# Patient Record
Sex: FEMALE | Race: WHITE | Employment: UNEMPLOYED | URBAN - METROPOLITAN AREA
[De-identification: names, ages, dates, MRNs, and addresses within clinical notes are randomized per-mention and may not be internally consistent; named-entity substitution may affect disease eponyms.]

---

## 2020-09-18 ENCOUNTER — HOSPITAL ENCOUNTER (INPATIENT)
Age: 50
LOS: 3 days | Discharge: HOME | DRG: 885 | End: 2020-09-21
Attending: EMERGENCY MEDICINE | Admitting: PSYCHIATRY & NEUROLOGY
Payer: COMMERCIAL

## 2020-09-18 DIAGNOSIS — F33.2 SEVERE EPISODE OF RECURRENT MAJOR DEPRESSIVE DISORDER, WITHOUT PSYCHOTIC FEATURES (HCC): Primary | ICD-10-CM

## 2020-09-18 DIAGNOSIS — F43.20 EMOTIONAL CRISIS: ICD-10-CM

## 2020-09-18 PROBLEM — F32.9 MAJOR DEPRESSIVE DISORDER: Status: ACTIVE | Noted: 2020-09-18

## 2020-09-18 LAB
ALBUMIN SERPL-MCNC: 3.8 G/DL (ref 3.4–5)
ALBUMIN/GLOB SERPL: 0.8 {RATIO} (ref 0.8–1.7)
ALP SERPL-CCNC: 110 U/L (ref 45–117)
ALT SERPL-CCNC: 50 U/L (ref 13–56)
AMPHET UR QL SCN: NEGATIVE
ANION GAP SERPL CALC-SCNC: 9 MMOL/L (ref 3–18)
AST SERPL-CCNC: 32 U/L (ref 10–38)
BARBITURATES UR QL SCN: NEGATIVE
BASOPHILS # BLD: 0.1 K/UL (ref 0–0.1)
BASOPHILS NFR BLD: 1 % (ref 0–2)
BENZODIAZ UR QL: NEGATIVE
BILIRUB SERPL-MCNC: 0.3 MG/DL (ref 0.2–1)
BUN SERPL-MCNC: 14 MG/DL (ref 7–18)
BUN/CREAT SERPL: 17 (ref 12–20)
CALCIUM SERPL-MCNC: 9 MG/DL (ref 8.5–10.1)
CANNABINOIDS UR QL SCN: NEGATIVE
CHLORIDE SERPL-SCNC: 107 MMOL/L (ref 100–111)
CO2 SERPL-SCNC: 25 MMOL/L (ref 21–32)
COCAINE UR QL SCN: NEGATIVE
CREAT SERPL-MCNC: 0.83 MG/DL (ref 0.6–1.3)
DIFFERENTIAL METHOD BLD: ABNORMAL
EOSINOPHIL # BLD: 0.1 K/UL (ref 0–0.4)
EOSINOPHIL NFR BLD: 1 % (ref 0–5)
ERYTHROCYTE [DISTWIDTH] IN BLOOD BY AUTOMATED COUNT: 12.4 % (ref 11.6–14.5)
ETHANOL SERPL-MCNC: <3 MG/DL (ref 0–3)
GLOBULIN SER CALC-MCNC: 4.7 G/DL (ref 2–4)
GLUCOSE SERPL-MCNC: 118 MG/DL (ref 74–99)
HCT VFR BLD AUTO: 50 % (ref 35–45)
HDSCOM,HDSCOM: NORMAL
HGB BLD-MCNC: 17.5 G/DL (ref 12–16)
LYMPHOCYTES # BLD: 1.3 K/UL (ref 0.9–3.6)
LYMPHOCYTES NFR BLD: 15 % (ref 21–52)
MCH RBC QN AUTO: 32.8 PG (ref 24–34)
MCHC RBC AUTO-ENTMCNC: 35 G/DL (ref 31–37)
MCV RBC AUTO: 93.8 FL (ref 74–97)
METHADONE UR QL: NEGATIVE
MONOCYTES # BLD: 0.6 K/UL (ref 0.05–1.2)
MONOCYTES NFR BLD: 7 % (ref 3–10)
NEUTS SEG # BLD: 6.5 K/UL (ref 1.8–8)
NEUTS SEG NFR BLD: 76 % (ref 40–73)
OPIATES UR QL: NEGATIVE
PCP UR QL: NEGATIVE
PLATELET # BLD AUTO: 308 K/UL (ref 135–420)
PMV BLD AUTO: 10.8 FL (ref 9.2–11.8)
POTASSIUM SERPL-SCNC: 3.4 MMOL/L (ref 3.5–5.5)
PROT SERPL-MCNC: 8.5 G/DL (ref 6.4–8.2)
RBC # BLD AUTO: 5.33 M/UL (ref 4.2–5.3)
SODIUM SERPL-SCNC: 141 MMOL/L (ref 136–145)
WBC # BLD AUTO: 8.5 K/UL (ref 4.6–13.2)

## 2020-09-18 PROCEDURE — 80307 DRUG TEST PRSMV CHEM ANLYZR: CPT

## 2020-09-18 PROCEDURE — 80053 COMPREHEN METABOLIC PANEL: CPT

## 2020-09-18 PROCEDURE — 65220000001 HC RM PRIVATE PSYCH

## 2020-09-18 PROCEDURE — 99285 EMERGENCY DEPT VISIT HI MDM: CPT

## 2020-09-18 PROCEDURE — 99284 EMERGENCY DEPT VISIT MOD MDM: CPT

## 2020-09-18 PROCEDURE — 85025 COMPLETE CBC W/AUTO DIFF WBC: CPT

## 2020-09-18 PROCEDURE — 74011250637 HC RX REV CODE- 250/637: Performed by: EMERGENCY MEDICINE

## 2020-09-18 PROCEDURE — 87635 SARS-COV-2 COVID-19 AMP PRB: CPT

## 2020-09-18 RX ORDER — POTASSIUM CHLORIDE 20 MEQ/1
20 TABLET, EXTENDED RELEASE ORAL
Status: COMPLETED | OUTPATIENT
Start: 2020-09-18 | End: 2020-09-18

## 2020-09-18 RX ADMIN — POTASSIUM CHLORIDE 20 MEQ: 1500 TABLET, EXTENDED RELEASE ORAL at 21:17

## 2020-09-18 NOTE — ED PROVIDER NOTES
Patient is a 56-year-old female who presents to the emergency department today with a chief complaint of mental health crisis. Patient evidently being served with a ECO. I read the EC O and evident the  filed this with the  because the patient has been increasingly withdrawn, not going to work, acting increasingly depressed, not eating with increasing somnolence. Also noted to have delusions reported on the ECO by her , however patient denies any of this for me. Reports that she has been depressed and more somnolent and withdrawn. She denies any somatic symptoms at this time. Denies any SI, HI or AVH. Denies any prior suicidal attempts. Past Medical History:   Diagnosis Date    Anxiety     Depression     Hypercholesteremia        History reviewed. No pertinent surgical history. History reviewed. No pertinent family history.     Social History     Socioeconomic History    Marital status:      Spouse name: Not on file    Number of children: Not on file    Years of education: Not on file    Highest education level: Not on file   Occupational History    Not on file   Social Needs    Financial resource strain: Not on file    Food insecurity     Worry: Not on file     Inability: Not on file    Transportation needs     Medical: Not on file     Non-medical: Not on file   Tobacco Use    Smoking status: Never Smoker   Substance and Sexual Activity    Alcohol use: No    Drug use: No    Sexual activity: Not on file   Lifestyle    Physical activity     Days per week: Not on file     Minutes per session: Not on file    Stress: Not on file   Relationships    Social connections     Talks on phone: Not on file     Gets together: Not on file     Attends Yazidi service: Not on file     Active member of club or organization: Not on file     Attends meetings of clubs or organizations: Not on file     Relationship status: Not on file    Intimate partner violence Fear of current or ex partner: Not on file     Emotionally abused: Not on file     Physically abused: Not on file     Forced sexual activity: Not on file   Other Topics Concern    Not on file   Social History Narrative    Not on file   Non-smoker, denies alcohol or drug use. No ongoing medical problems. ALLERGIES: Sulfa (sulfonamide antibiotics)    Review of Systems   Constitutional: Negative for chills and fever. HENT: Negative for congestion, rhinorrhea, sinus pressure and sneezing. Eyes: Negative for visual disturbance. Respiratory: Negative for cough and shortness of breath. Cardiovascular: Negative for chest pain. Gastrointestinal: Negative for abdominal pain, diarrhea, nausea and vomiting. Genitourinary: Negative for dysuria, frequency and urgency. Musculoskeletal: Negative for back pain and neck pain. Skin: Negative for rash. Neurological: Negative for syncope, numbness and headaches. Psychiatric/Behavioral: Negative for suicidal ideas. Vitals:    09/18/20 1726   BP: 135/81   Pulse: 99   Resp: 19   Temp: 97.5 °F (36.4 °C)   SpO2: 98%            Physical Exam  Constitutional:       General: She is not in acute distress. Appearance: Normal appearance. She is normal weight. She is not ill-appearing or toxic-appearing. HENT:      Head: Normocephalic and atraumatic. Right Ear: External ear normal.      Left Ear: External ear normal.      Nose: Nose normal. No congestion or rhinorrhea. Mouth/Throat:      Mouth: Mucous membranes are moist.      Pharynx: Oropharynx is clear. No oropharyngeal exudate or posterior oropharyngeal erythema. Eyes:      Extraocular Movements: Extraocular movements intact. Conjunctiva/sclera: Conjunctivae normal.      Pupils: Pupils are equal, round, and reactive to light. Neck:      Musculoskeletal: Normal range of motion and neck supple. No muscular tenderness.    Cardiovascular:      Rate and Rhythm: Normal rate and regular rhythm. Pulses: Normal pulses. Heart sounds: Normal heart sounds. No murmur. Pulmonary:      Effort: Pulmonary effort is normal.      Breath sounds: Normal breath sounds. No wheezing, rhonchi or rales. Abdominal:      General: Abdomen is flat. Palpations: Abdomen is soft. Tenderness: There is no abdominal tenderness. There is no guarding or rebound. Musculoskeletal: Normal range of motion. General: No swelling, tenderness or deformity. Skin:     General: Skin is warm and dry. Capillary Refill: Capillary refill takes less than 2 seconds. Findings: No rash. Neurological:      General: No focal deficit present. Mental Status: She is alert. Cranial Nerves: No cranial nerve deficit. Sensory: No sensory deficit. Motor: No weakness. Comments: Oriented x4. Psychiatric:         Attention and Perception: Attention and perception normal.         Mood and Affect: Mood and affect normal. Affect is not angry, tearful or inappropriate. Speech: Speech normal.         Behavior: Behavior normal.       Recent Results (from the past 12 hour(s))   CBC WITH AUTOMATED DIFF    Collection Time: 09/18/20  5:36 PM   Result Value Ref Range    WBC 8.5 4.6 - 13.2 K/uL    RBC 5.33 (H) 4.20 - 5.30 M/uL    HGB 17.5 (H) 12.0 - 16.0 g/dL    HCT 50.0 (H) 35.0 - 45.0 %    MCV 93.8 74.0 - 97.0 FL    MCH 32.8 24.0 - 34.0 PG    MCHC 35.0 31.0 - 37.0 g/dL    RDW 12.4 11.6 - 14.5 %    PLATELET 384 693 - 597 K/uL    MPV 10.8 9.2 - 11.8 FL    NEUTROPHILS 76 (H) 40 - 73 %    LYMPHOCYTES 15 (L) 21 - 52 %    MONOCYTES 7 3 - 10 %    EOSINOPHILS 1 0 - 5 %    BASOPHILS 1 0 - 2 %    ABS. NEUTROPHILS 6.5 1.8 - 8.0 K/UL    ABS. LYMPHOCYTES 1.3 0.9 - 3.6 K/UL    ABS. MONOCYTES 0.6 0.05 - 1.2 K/UL    ABS. EOSINOPHILS 0.1 0.0 - 0.4 K/UL    ABS.  BASOPHILS 0.1 0.0 - 0.1 K/UL    DF AUTOMATED     METABOLIC PANEL, COMPREHENSIVE    Collection Time: 09/18/20  5:36 PM   Result Value Ref Range Sodium 141 136 - 145 mmol/L    Potassium 3.4 (L) 3.5 - 5.5 mmol/L    Chloride 107 100 - 111 mmol/L    CO2 25 21 - 32 mmol/L    Anion gap 9 3.0 - 18 mmol/L    Glucose 118 (H) 74 - 99 mg/dL    BUN 14 7.0 - 18 MG/DL    Creatinine 0.83 0.6 - 1.3 MG/DL    BUN/Creatinine ratio 17 12 - 20      GFR est AA >60 >60 ml/min/1.73m2    GFR est non-AA >60 >60 ml/min/1.73m2    Calcium 9.0 8.5 - 10.1 MG/DL    Bilirubin, total 0.3 0.2 - 1.0 MG/DL    ALT (SGPT) 50 13 - 56 U/L    AST (SGOT) 32 10 - 38 U/L    Alk. phosphatase 110 45 - 117 U/L    Protein, total 8.5 (H) 6.4 - 8.2 g/dL    Albumin 3.8 3.4 - 5.0 g/dL    Globulin 4.7 (H) 2.0 - 4.0 g/dL    A-G Ratio 0.8 0.8 - 1.7     ETHYL ALCOHOL    Collection Time: 09/18/20  5:36 PM   Result Value Ref Range    ALCOHOL(ETHYL),SERUM <3 0 - 3 MG/DL   DRUG SCREEN, URINE    Collection Time: 09/18/20  8:10 PM   Result Value Ref Range    BENZODIAZEPINES Negative NEG      BARBITURATES Negative NEG      THC (TH-CANNABINOL) Negative NEG      OPIATES Negative NEG      PCP(PHENCYCLIDINE) Negative NEG      COCAINE Negative NEG      AMPHETAMINES Negative NEG      METHADONE Negative NEG      HDSCOM (NOTE)           MDM  Number of Diagnoses or Management Options  Diagnosis management comments: 49-year-old female presenting with ECL for concerns about abnormal behavior. Evidently the patient's  indicated that patient has been behaving abnormally and is delusional.  She does not appear to be responding to internal stimuli on my encounter with her. She denies any suicidal homicidal ideations. She has no complaints really at all today. She endorses being depressed, and that she has not been going to work and has not been leaving her house or eating. Differential includes major depressive disorder, psychotic disorder, schizophrenia, bipolar disorder, emotional crisis, malingering, generalized anxiety disorder, schizoaffective disorder. Patient is here under ECO.   She is medically clear at this point her labs reflect polycythemia could be dehydration versus secondary to his smoking. Patient will be seen by the community service Board and screened at that point. I understand the patient has a TDO per community service Board. Further disposition to follow. Patient's care is discussed and questions answered. Oncoming physician introduced to the patient. Patient signed off to the oncoming ED physician, Dr. Dickens Her 9:15 PM  Patient is awaiting CSB/Psych eval  Please reevaluate prior to disposition. ED Course as of Sep 18 2102   Fri Sep 18, 2020   2017 Patient's lab reflux and polycythemia, likely secondary to dehydration versus chronic due to smoking. Patient at this point medically clear. [TIAN]      ED Course User Index  [TIAN] Radhames Whitten DO       Procedures    .

## 2020-09-18 NOTE — ED TRIAGE NOTES
Pt to ED Via [employer] custody, who report that pt has been talking to herself and is paranoid. [employer] reports pt refused to seek help so family took out ECO. Pt presents cooperative with staff, denies SI and HI visible or auditory hallucinations.

## 2020-09-19 PROBLEM — F33.2 SEVERE EPISODE OF RECURRENT MAJOR DEPRESSIVE DISORDER, WITHOUT PSYCHOTIC FEATURES (HCC): Status: ACTIVE | Noted: 2020-09-19

## 2020-09-19 PROBLEM — F32.9 MAJOR DEPRESSIVE DISORDER: Status: RESOLVED | Noted: 2020-09-18 | Resolved: 2020-09-19

## 2020-09-19 LAB
COVID-19 RAPID TEST, COVR: NOT DETECTED
SOURCE, COVRS: NORMAL
SPECIMEN TYPE, XMCV1T: NORMAL

## 2020-09-19 PROCEDURE — 74011250637 HC RX REV CODE- 250/637: Performed by: PSYCHIATRY & NEUROLOGY

## 2020-09-19 PROCEDURE — 65220000003 HC RM SEMIPRIVATE PSYCH

## 2020-09-19 RX ORDER — RISPERIDONE 1 MG/1
1 TABLET, FILM COATED ORAL
Status: DISCONTINUED | OUTPATIENT
Start: 2020-09-19 | End: 2020-09-20

## 2020-09-19 RX ORDER — LORAZEPAM 1 MG/1
1 TABLET ORAL
Status: DISCONTINUED | OUTPATIENT
Start: 2020-09-19 | End: 2020-09-21 | Stop reason: HOSPADM

## 2020-09-19 RX ORDER — BENZTROPINE MESYLATE 1 MG/ML
1 INJECTION INTRAMUSCULAR; INTRAVENOUS
Status: DISCONTINUED | OUTPATIENT
Start: 2020-09-19 | End: 2020-09-21 | Stop reason: HOSPADM

## 2020-09-19 RX ORDER — LORAZEPAM 2 MG/ML
1 INJECTION INTRAMUSCULAR
Status: DISCONTINUED | OUTPATIENT
Start: 2020-09-19 | End: 2020-09-21 | Stop reason: HOSPADM

## 2020-09-19 RX ORDER — TRAZODONE HYDROCHLORIDE 50 MG/1
50 TABLET ORAL
Status: DISCONTINUED | OUTPATIENT
Start: 2020-09-19 | End: 2020-09-21 | Stop reason: HOSPADM

## 2020-09-19 RX ORDER — HALOPERIDOL 5 MG/1
5 TABLET ORAL
Status: DISCONTINUED | OUTPATIENT
Start: 2020-09-19 | End: 2020-09-21 | Stop reason: HOSPADM

## 2020-09-19 RX ORDER — BENZTROPINE MESYLATE 1 MG/1
1 TABLET ORAL
Status: DISCONTINUED | OUTPATIENT
Start: 2020-09-19 | End: 2020-09-21 | Stop reason: HOSPADM

## 2020-09-19 RX ORDER — ESCITALOPRAM OXALATE 10 MG/1
10 TABLET ORAL
Status: DISCONTINUED | OUTPATIENT
Start: 2020-09-19 | End: 2020-09-21 | Stop reason: HOSPADM

## 2020-09-19 RX ORDER — HYDROXYZINE PAMOATE 50 MG/1
50 CAPSULE ORAL
Status: DISCONTINUED | OUTPATIENT
Start: 2020-09-19 | End: 2020-09-21 | Stop reason: HOSPADM

## 2020-09-19 RX ORDER — HALOPERIDOL 5 MG/ML
5 INJECTION INTRAMUSCULAR
Status: DISCONTINUED | OUTPATIENT
Start: 2020-09-19 | End: 2020-09-21 | Stop reason: HOSPADM

## 2020-09-19 RX ADMIN — ESCITALOPRAM 10 MG: 10 TABLET, FILM COATED ORAL at 20:10

## 2020-09-19 NOTE — BH NOTES
Patient's  called Kathleen Prater). He states the patient has a history of Depression with psychosis; however, she has not had an episode in over 20 years. He states recently the patient has been talking to herself frequently (as though she is responding to internal stimuli). He would like for the Dr to call him before the patient goes to court. His number is [phone].

## 2020-09-19 NOTE — H&P
History and Physical        Patient: Cindy Espinosa               Sex: female          DOA: 9/18/2020         YOB: 1970      Age:  44 y.o.        LOS:  LOS: 1 day        HPI:     Cindy Espinosa is a 44 y.o.  female who was admitted under TDO experiencing   depression, being withdrawn, delusional, missing work, and not eating or sleeping. Principal Problem:    Severe episode of recurrent major depressive disorder, without psychotic features ([facility] [address] (9/19/2020)        Past Medical History:   Diagnosis Date    Anxiety     Depression     Hypercholesteremia        History reviewed. No pertinent surgical history. History reviewed. No pertinent family history. Social History     Socioeconomic History    Marital status:      Spouse name: Not on file    Number of children: Not on file    Years of education: Not on file    Highest education level: Not on file   Tobacco Use    Smoking status: Never Smoker   Substance and Sexual Activity    Alcohol use: No    Drug use: No       Prior to Admission medications    Medication Sig Start Date End Date Taking? Authorizing Provider   melatonin 300 mcg tab Take 1.5 mg by mouth nightly as needed. 10/11/15  Yes Chaparrita Treadwell MD   atorvastatin (LIPITOR) 20 mg tablet Take 10 mg by mouth daily. Yes Other, MD Maxime   escitalopram oxalate (LEXAPRO) 10 mg tablet Take 10 mg by mouth daily. Yes Other, MD Maxime   ondansetron (ZOFRAN ODT) 4 mg disintegrating tablet Take 1 Tab by mouth every eight (8) hours as needed for Nausea. 10/11/15   Chaparrita Treadwell MD   HYDROcodone-acetaminophen (VICODIN) 5-300 mg tablet Take 1-2 Tabs by mouth every six (6) hours as needed for Pain. Max Daily Amount: 8 Tabs.  10/11/15   Chaparrita Treadwell MD       Allergies   Allergen Reactions    Sulfa (Sulfonamide Antibiotics) Anaphylaxis       Review of Systems  A comprehensive review of systems was negative except for that written in the History of Present Illness. Physical Exam:      Visit Vitals  BP (!) 144/92 (BP 1 Location: Right arm, BP Patient Position: Sitting)   Pulse 88   Temp 97.2 °F (36.2 °C)   Resp 20   SpO2 100%   Breastfeeding No       Physical Exam:  Physical Exam:   General:  Alert, cooperative, no distress, obese, appears stated age. Eyes:  Conjunctivae/corneas clear. PERRL, EOMs intact. Fundi benign   Ears:  Normal TMs and external ear canals both ears. Nose: Nares normal. Septum midline. Mucosa normal. No drainage or sinus tenderness. Mouth/Throat: Lips, mucosa, and tongue normal. Teeth and gums normal.   Neck: Supple, symmetrical, trachea midline, no adenopathy, thyroid: no enlargement/tenderness/nodules, no carotid bruit and no JVD. Back:   Symmetric, no curvature. ROM normal. No CVA tenderness. Lungs:   Clear to auscultation bilaterally. Heart:  Regular rate and rhythm, S1, S2 normal, no murmur, click, rub or gallop. Abdomen:   Soft, obese, non-tender. Bowel sounds normal. No masses,  No organomegaly. Extremities: Extremities normal, atraumatic, no cyanosis or edema. Pulses: 2+ and symmetric all extremities. Skin: Skin color, texture, turgor normal. No rashes or lesions   Lymph nodes: Cervical, supraclavicular, and axillary nodes normal.   Neurologic: CNII-XII intact. Normal strength, sensation and reflexes throughout. Assessment/Plan     Plan is for patient ro participate in all unit activities, take medications as ordered and   follow all physician's in and out patient orders.

## 2020-09-19 NOTE — H&P
[address] [facility] [address]  Inpatient Admission Note    Date of Service:  09/19/20    Historian(s): Emma Gaming and chart review  Referral Source: [facility]    Chief Complaint   TDO for depression and bizarre behavior    History of Present Illness     Emma Gaming is a 44 y.o. WHITE OR  female with a history of major depressive disorder with psychotic features who was admitted under TDO for worsening mood and delusions. According to hospital records, ECO was taken out by her  due to the patient not functioning appropriately.  stated that she has a history of major depressive disorder with psychotic features, and that in the past week she has been sleeping more than usual, not eating, missed a day at work without notifying supervisor and has been delusional.  It is unclear what  delusions she has been having. According to ED notes,  stated that patient was talking to herself and was paranoid and was refusing to come to the hospital which is why her  took out ECO. Patient was seen by [facility] crisis worker but prescreening paperwork is not in the chart. The patient reports that she has been very tired in the past week due to stress at work. The tiredness has led to increased somnolence at home. She states she also thought that she may have had the coronavirus this past week because she had muscle aches and lost her sense of taste or smell for a day. She also complains of decreasing appetite and weight loss although not sure how many pounds. Nevertheless, she says all of this is due to working too hard and not taking vacation. Work has been more stressful lately due to loss of coworkers from [employer]. She denies feeling depressed or sad although she admits to having crying spells which she says are due to work-related stress. She denies anhedonia, feelings of helplessness or hopelessness. She denies suicidal ideations.   She denies auditory or visual hallucinations or paranoia. The patient denies use of alcohol and recreational drugs. Her UDS was negative. Medical Review of Systems     Review of Systems   Constitutional: Negative for chills and fever. Patient complains of fatigue and weight loss. HENT: Negative for congestion, rhinorrhea, sinus pressure and sneezing. Eyes: Negative for visual disturbance. Respiratory: Negative for cough and shortness of breath. Cardiovascular: Negative for chest pain. Gastrointestinal: Negative for abdominal pain, diarrhea, nausea and vomiting. Genitourinary: Negative for dysuria, frequency and urgency. Musculoskeletal: Negative for back pain and neck pain. Skin: Negative for rash. Neurological: Negative for syncope, numbness and headaches. Psychiatric/Behavioral: Negative for suicidal ideas. All other systems reviewed and are negative. Psychiatric Treatment History     Self-injurious behavior/risky thoughts or behaviors (past suicidal ideation/attempt):   Denies any prior history of thoughts of self-harm or suicidal actions. Violence/Risk to others (past homicidal ideation/attempt):   Denies any prior history of violence or homicidal ideation. Previous psychiatric medication trials: Lexapro prescribed by PCP    Previous psychiatric hospitalizations: Patient denies history of prior hospitalizations    Current therapist: None    Current psychiatric provider: None    Allergies      Allergies   Allergen Reactions    Sulfa (Sulfonamide Antibiotics) Anaphylaxis       Medical History     Past Medical History:   Diagnosis Date    Anxiety     Depression     Hypercholesteremia      Past history of pancreatitis    Medication(s)     Prior to Admission Medications   Prescriptions Last Dose Informant Patient Reported? Taking? HYDROcodone-acetaminophen (VICODIN) 5-300 mg tablet Unknown at Unknown time  No No   Sig: Take 1-2 Tabs by mouth every six (6) hours as needed for Pain.  Max Daily Amount: 8 Tabs. atorvastatin (LIPITOR) 20 mg tablet 2020 at Unknown time  Yes Yes   Sig: Take 10 mg by mouth daily. escitalopram oxalate (LEXAPRO) 10 mg tablet 2020 at Unknown time  Yes Yes   Sig: Take 10 mg by mouth daily. melatonin 300 mcg tab 2020 at Unknown time  No Yes   Sig: Take 1.5 mg by mouth nightly as needed. ondansetron (ZOFRAN ODT) 4 mg disintegrating tablet Unknown at Unknown time  No No   Sig: Take 1 Tab by mouth every eight (8) hours as needed for Nausea. Facility-Administered Medications: None         Substance Abuse History     Tobacco: denied  Alcohol: denied  Marijuana: denied  Cocaine: denied  Opiate: denied  Benzodiazepine: denied  Other: denied    Consequences: none    History of detox: none    History of substance abuse treatment: none    Family History     History reviewed. No pertinent family history. Psychiatric Family History  Mother with history of depression and anxiety. Family history of suicide? No    Social History     Patient was born and raised in [place]. Raised mainly by her mother because her father  when she was about 5. She is  and has no children. She currently lives in [place] with her . She says she had a good childhood. She has 1 sister. She denies any type of physical, sexual or emotional abuse. She denies history of past trauma. She denies legal history. She has a bachelor's degree in [place]. 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     Vitals/Labs      Vitals:    20 2110 20 0250 20 0330 20 0833   BP: (!) 140/86 (!) 136/95 (!) 136/95 (!) 144/92   Pulse: 82 88 88    Resp:    Temp: 97.7 °F (36.5 °C) 97.1 °F (36.2 °C) 97 °F (36.1 °C) 97.2 °F (36.2 °C)   SpO2: 97% 100%         Labs:   Results for orders placed or performed during the hospital encounter of 20   CBC WITH AUTOMATED DIFF   Result Value Ref Range    WBC 8.5 4.6 - 13.2 K/uL RBC 5.33 (H) 4.20 - 5.30 M/uL    HGB 17.5 (H) 12.0 - 16.0 g/dL    HCT 50.0 (H) 35.0 - 45.0 %    MCV 93.8 74.0 - 97.0 FL    MCH 32.8 24.0 - 34.0 PG    MCHC 35.0 31.0 - 37.0 g/dL    RDW 12.4 11.6 - 14.5 %    PLATELET 693 279 - 041 K/uL    MPV 10.8 9.2 - 11.8 FL    NEUTROPHILS 76 (H) 40 - 73 %    LYMPHOCYTES 15 (L) 21 - 52 %    MONOCYTES 7 3 - 10 %    EOSINOPHILS 1 0 - 5 %    BASOPHILS 1 0 - 2 %    ABS. NEUTROPHILS 6.5 1.8 - 8.0 K/UL    ABS. LYMPHOCYTES 1.3 0.9 - 3.6 K/UL    ABS. MONOCYTES 0.6 0.05 - 1.2 K/UL    ABS. EOSINOPHILS 0.1 0.0 - 0.4 K/UL    ABS. BASOPHILS 0.1 0.0 - 0.1 K/UL    DF AUTOMATED     METABOLIC PANEL, COMPREHENSIVE   Result Value Ref Range    Sodium 141 136 - 145 mmol/L    Potassium 3.4 (L) 3.5 - 5.5 mmol/L    Chloride 107 100 - 111 mmol/L    CO2 25 21 - 32 mmol/L    Anion gap 9 3.0 - 18 mmol/L    Glucose 118 (H) 74 - 99 mg/dL    BUN 14 7.0 - 18 MG/DL    Creatinine 0.83 0.6 - 1.3 MG/DL    BUN/Creatinine ratio 17 12 - 20      GFR est AA >60 >60 ml/min/1.73m2    GFR est non-AA >60 >60 ml/min/1.73m2    Calcium 9.0 8.5 - 10.1 MG/DL    Bilirubin, total 0.3 0.2 - 1.0 MG/DL    ALT (SGPT) 50 13 - 56 U/L    AST (SGOT) 32 10 - 38 U/L    Alk. phosphatase 110 45 - 117 U/L    Protein, total 8.5 (H) 6.4 - 8.2 g/dL    Albumin 3.8 3.4 - 5.0 g/dL    Globulin 4.7 (H) 2.0 - 4.0 g/dL    A-G Ratio 0.8 0.8 - 1.7     ETHYL ALCOHOL   Result Value Ref Range    ALCOHOL(ETHYL),SERUM <3 0 - 3 MG/DL   DRUG SCREEN, URINE   Result Value Ref Range    BENZODIAZEPINES Negative NEG      BARBITURATES Negative NEG      THC (TH-CANNABINOL) Negative NEG      OPIATES Negative NEG      PCP(PHENCYCLIDINE) Negative NEG      COCAINE Negative NEG      AMPHETAMINES Negative NEG      METHADONE Negative NEG      HDSCOM (NOTE)    SARS-COV-2   Result Value Ref Range    Specimen source Nasopharyngeal      COVID-19 rapid test Not detected NOTD      Specimen type NP Swab         Mental Status Examination     Appearance/Hygiene 44 y.o.  WHITE OR [de-identified] female  Hygiene: Fair, dressed in hospital gown   Behavior/Social Relatedness appropriate   Musculoskeletal Gait/Station: Normal gait and station  Tone (flaccid, cogwheeling, spastic): normal  Psychomotor (hyperkinetic, hypokinetic): calm  Involuntary movements (tics, dyskinesias, akathisa, stereotypies): none   Speech   Rate, rhythm, volume, fluency and articulation are appropriate   Mood   patient denies feeling depressed. States her mood is fine   Affect    wide range   Thought Process Linear and goal directed, tight associations    Vagueness, incoherence, circumstantiality, tangentiality, neologisms, perseveration, flight of ideas, or self-contradictory statements not present on assessment   Thought Content and Perceptual Disturbances Denies delusions, ideas of reference, overvalued ideas, ruminations, obsession, compulsions, and phobias    Denies self-injurious behavior (SIB), suicidal ideation (SI), aggressive behavior or homicidal ideation (HI)    Denies auditory and visual hallucinations   Sensorium and Cognition  A&Ox4, attention intact, recent and remote memory intact, language use appropriate, and fund of knowledge age appropriate, patient is aware of current events. Insight  Limited   Judgment  fair       Suicide Risk Assessment     Admission  Date/Time: 09/19/20    [x] Admission  [] Discharge     Key Factors:   Current admission precipitated by suicide attempt?   []  Yes     2    [x]  No     1     Suicide Attempt History  [] Past attempts of high lethality    2 []  Past attempts of low lethality    1 [x]  No previous attempts       0   Suicidal Ideation []  Constant suicidal thoughts      2 []  Intermittent or fleeting suicidal  thoughts  1 [x]  Denies current suicidal thoughts    0   Suicide Plan   []  Has plan with actual OR potential access to planned method    2 []  Has plan without access to planned method      1 []  No plan            0   Plan Lethality []  Highly lethal plan (Carbon monoxide, gun, hanging, jumping)    2 []  Moderate lethality of plan          1 []  Low lethality of plan (biting, head banging, superficial scratching, pillow over face)  0   Safety Plan Agreement  []  Unwilling OR unable to agree due to impaired reality testing   2   []  Patient is ambivalent and/or guarded      1 [x]  Reliably agrees        0   Current Morbid Thoughts (reunion fantasies, preoccupations with death) []  Constantly     2     []  Frequently    1 [x]  Rarely    0   Elopement Risk  []  High risk     2 []  Moderate risk    1 [x]   Low risk    0   Symptoms    []  Hopeless  []  Helpless  []  Anhedonia   []  Guilt/shame  []  Anger/rage  [x]  Anxiety  []  Insomnia   []  Agitation   []  Impulsivity  []  5-6 symptoms present    2 []  3-4 symptoms present    1  [x]  0-2 symptoms present    0     Total Score: 1  --------------------------------------------------------------------------------------------------------------  Subjective Appraisal of Risk:  []  Patient replies not trustworthy: several non-verbal cues. []  Patient replies questionable: trustworthy: at least 1 non-verbal cue. [x]  Patient replies appear trustworthy. Protective measures (select all that apply):  [x]  Successful past responses to stress  []  Spiritual/Presybeterian beliefs  [x]  Capacity for reality testing  []  Positive therapeutic relationships  [x]  Social supports/connections  []  Positive coping skills  []  Frustration tolerance/optimism  []  Children or pets in the home  []  Sense of responsibility to family  [x]  Agrees to treatment plan and follow up    High Risk Diagnoses (select all that apply):  [x]  Depression/Bipolar Disorder  []  Dual Diagnosis  []  Cardiovascular Disease  []  Schizophrenia  []  Chronic Pain  []  Epilepsy  []  Cancer  []  Personality Disorder  []  HIV/AIDS  []  Multiple Sclerosis    Dangerousness Assessment (Suicide, homicide, property destruction. ..)    Risk Factors reviewed and risk assessed to be:  [x] low  [] low-moderate  [] moderate   [] moderate-high  [] high     Protection factors reviewed and risk assessed to be:  [x] low  [] low-moderate  [] moderate   [] moderate-high  [] high     Response to treatment and risk assessed to be:  [x] low  [] low-moderate  [] moderate   [] moderate-high  [] high     Support reviewed and risk assessed to be:  [x] low  [] low-moderate  [] moderate   [] moderate-high  [] high     Acceptance of Discharge and outpatient treatment reviewed and risk assessed to be:    [x] low  [] low-moderate  [] moderate   [] moderate-high  [] high   Overall risk assessed to be:  [x] low  [] low-moderate  [] moderate   [] moderate-high  [] high       Assessment and Plan     Psychiatric Diagnoses:   Patient Active Problem List   Diagnosis Code    Severe episode of recurrent major depressive disorder, without psychotic features ([facility] [address] F33.2       Psychosocial and contextual factors: Work-related stressors    Level of impairment/disability: Moderate    Elton Bamberger is a 44 y.o. who is currently admitted under TDO for depressive symptoms possible psychosis. Patient does not appear to be psychotic at this time. She has not been observed responding to internal stimuli and her thought process is linear and organized. 1. Admit to locked inpatient behavioral health unit. Start milieu, group, art and occupation therapy. Continue Lexapro 10 mg at bedtime  2. Obtain collateral information from her . 3. Routine labs ordered and reviewed by this provider. 4. Reviewed instructions, risks, benefits and side effects. 5. Start disposition planning; verify upcoming outpatient appointments with therapist and/or psychiatric medication prescriber.    6. Tentative date of discharge: 2-3 days       Jabari Esposito MD  [address] [facility]

## 2020-09-19 NOTE — PROGRESS NOTES
09/19/20 1301   Group Therapy   Group Nursing   Attendance Did not attend   Encouraged to attend but did not

## 2020-09-19 NOTE — ED NOTES
Gave patient report to Fermin RN of [facility]. Patient assigned to room 104. New set of vitals will be taken prior to transporting patient to room. Also awaiting to hear back from provider regarding orders for patient's anxiety.

## 2020-09-19 NOTE — BH NOTES
TDO patient received on MORENO-1 from ER via wheelchair accompanied by ER nurse and security. Patient is alert oriented x3. Patient is pleasant but guarded. Patient denies SI/HI/AVH. States I'm here because my family wanted me to get checked out. Patient states that she has a hx of depression \"but I'm not depressed now. \"They didn't tell me I was being admitted and was going to have to stay. No one explained that. I am no longer with [employer] I go to [facility]. \" Patient either denied or answered \"no\" to all of the assessment questions. Patient oriented to her room and unit. No complaints or concerns voiced. Will continue to monitor for safety and changes.   RN WILL INITIATE, DEVELOP, IMPLEMENT, REVIEW OR REVISE TREATMENT PLAN

## 2020-09-19 NOTE — ED NOTES
Patient was informed she has been assigned an inpatient bed and is anxious and agitated. Page sent to provider via .

## 2020-09-19 NOTE — PROGRESS NOTES
Problem: Depressed Mood (Adult/Pediatric)  Goal: *STG: Participates in treatment plan  Description: Patient will participate in treatment plan while in hospital.  Outcome: Progressing Towards Goal  Goal: *STG: Participates in 1:1 therapy sessions  Description: Patient will participate in 1:1 therapy sessions while in hospital.  Outcome: Progressing Towards Goal  Goal: *STG: Attends activities and groups  Description: Patient will attend activities and groups daily while in hospital  Outcome: Progressing Towards Goal  Goal: *STG: Remains safe in hospital  Description: Ptatient will remain safe while in hospital.  Outcome: Progressing Towards Goal  Goal: *STG: Complies with medication therapy  Description: Patient will comply with medication therapy while in hospital.  Outcome: Progressing Towards Goal     Problem: Falls - Risk of  Goal: *Absence of Falls  Description: Document Farooq Fall Risk and appropriate interventions in the flowsheet each shift while in hospital  Outcome: Progressing Towards Goal  Note: Fall Risk Interventions:            Medication Interventions: Teach patient to arise slowly                   Problem: Pancreatitis  Goal: *Optimize nutritional status  Description: Patients nutritional status will be optimized while in hospital.  Outcome: Progressing Towards Goal   Pt crying in the day area. She states she is just very upset about being here and does not want to be here. Pt denies hallucinations and any thoughts of harming self or others. She was able to calm down in one to one and joke smile and chuckle with this nurse. She ate her breakfast this AM. Pt did not have any scheduled medication this morning.

## 2020-09-20 VITALS
HEART RATE: 82 BPM | RESPIRATION RATE: 16 BRPM | SYSTOLIC BLOOD PRESSURE: 121 MMHG | DIASTOLIC BLOOD PRESSURE: 77 MMHG | TEMPERATURE: 97.1 F | OXYGEN SATURATION: 100 %

## 2020-09-20 PROCEDURE — 74011250637 HC RX REV CODE- 250/637: Performed by: PSYCHIATRY & NEUROLOGY

## 2020-09-20 PROCEDURE — 65220000003 HC RM SEMIPRIVATE PSYCH

## 2020-09-20 RX ADMIN — ESCITALOPRAM 10 MG: 10 TABLET, FILM COATED ORAL at 20:36

## 2020-09-20 NOTE — BH NOTES
Pt has been isolated to her room for the majority of the shift. Calm, no behavioral issues and no complaints. Compliant with escitalopram but refused scheduled risperidone -- \"I don't need that. \" Denies SI/HI/AVH at this time. Will continue to monitor for safety.

## 2020-09-20 NOTE — PROGRESS NOTES
09/20/20 1002   Group Therapy   Group Nursing   Attendance Did not attend   Pt was encouraged to attend but did not

## 2020-09-20 NOTE — PROGRESS NOTES
Problem: Depressed Mood (Adult/Pediatric)  Goal: *STG: Participates in treatment plan  Description: Patient will participate in treatment plan while in hospital.  Outcome: Progressing Towards Goal  Goal: *STG: Participates in 1:1 therapy sessions  Description: Patient will participate in 1:1 therapy sessions while in hospital.  Outcome: Progressing Towards Goal  Goal: *STG: Remains safe in hospital  Description: Ptatient will remain safe while in hospital.  Outcome: Progressing Towards Goal     Problem: Falls - Risk of  Goal: *Absence of Falls  Description: Document Heladio Celestin Fall Risk and appropriate interventions in the flowsheet each shift while in hospital  Outcome: Progressing Towards Goal  Note: Fall Risk Interventions:            Medication Interventions: Teach patient to arise slowly     Pt is isolating in bed. She refused breakfast and lunch. She is drinking water brought to her by staff. Pt denies any thoughts of harming self or others and hallucinations. She has been tearful off and on. Her  called but she refused to talk to him. She has been encouraged to come out to the day area and to eat but pt refuses. It was reported to this nurse that pt took her anti-depressant last evening but refused her risperdal stating she doesn't need it.

## 2020-09-20 NOTE — PROGRESS NOTES
[address] [facility] [address]  Inpatient Progress Note     Date of Service: 09/20/20  Hospital Day: 2     Subjective/Interval History   09/20/20    Treatment Team Notes:  Notes reviewed and/or discussed and report that Maurice is a 69-year-old female who was admitted under TDO for depressive symptoms with psychosis. Per nursing report, she has been mainly withdrawn to her room and has missed some meals. She did not eat breakfast this morning and she did not eat lunch yesterday. She refused to take Risperdal last night stating she does not need it. No psychotic symptoms have been observed so far. Her mother called and spoke to the [email or web address] Tom Smith. She informed him that the patient's brother-in-law is a psychiatrist and she is retired psych nurse and they will be able to take care of patient at home if she is released from court tomorrow. Patient interview: Maurice was interviewed by this writer today. Patient was seen Jaya Molina in bed and was still guarded. She said she was not feeling depressed but was just tired. She denied suicidal ideations. She denies psychotic symptoms. She reported decreasing appetite. She did appear somewhat exasperated with assessment questions. Option of increasing Lexapro dose was discussed with her but she was not willing to do this and did not feel that it was necessary. Objective     Visit Vitals  /89 (BP 1 Location: Right arm, BP Patient Position: Sitting)   Pulse 93   Temp 97.5 °F (36.4 °C)   Resp 16   SpO2 100%   Breastfeeding No       No results found for this or any previous visit (from the past 24 hour(s)). Mental Status Examination     Appearance/Hygiene 44 y.o.  WHITE OR  female  Hygiene: Mildly disheveled, dressed in pajamas   Behavior/Social Relatedness Appropriate   Musculoskeletal Gait/Station: appropriate  Tone (flaccid, cogwheeling, spastic): Normal  Psychomotor (hyperkinetic, hypokinetic): calm Involuntary movements (tics, dyskinesias, akathisa, stereotypies): none   Speech   Rate, rhythm, volume, fluency and articulation are appropriate   Mood   \"fine\"   Affect    restricted   Thought Process Linear and goal directed   Thought Content and Perceptual Disturbances Denies self-injurious behavior (SIB), suicidal ideation (SI), aggressive behavior or homicidal ideation (HI)    Denies auditory and visual hallucinations   Sensorium and Cognition  alert and oriented x4   Insight  Limited   Judgment  fair        Assessment/Plan      Psychiatric Diagnoses:   Patient Active Problem List   Diagnosis Code    Severe episode of recurrent major depressive disorder, without psychotic features ([facility] [address] F33.2       Patient's condition appears to be better than what was described in the emergency room. No psychosis has been observed so far. Continue Lexapro 10 mg at bedtime. Discontinue Risperdal. Hopefully she will be released by the court tomorrow and will be discharged as she really does not want to be here.     Jaswant Ramirez MD  Medical [facility]  Psychiatry

## 2020-09-21 NOTE — DISCHARGE SUMMARY
[facility]  Inpatient Psychiatry   Discharge Summary     Admit date: 9/18/2020    Discharge date and time: 9/21/2020 11:33 AM    Discharge Physician: Tran Holden MD    DISCHARGE DIAGNOSES     Psychiatric Diagnoses:   Patient Active Problem List   Diagnosis Code    Severe episode of recurrent major depressive disorder, without psychotic features ([facility] F33.2       Level of impairment/disability:  [address] [facility] is a 44 y.o. WHITE [place]  female with a history of major depressive disorder with psychotic features who was admitted under TDO for worsening mood and delusions. According to hospital records, ECO was taken out by her  due to the patient not functioning appropriately.  stated that she has a history of major depressive disorder with psychotic features, and that in the past week she has been sleeping more than usual, not eating, missed a day at work without notifying supervisor and has been delusional.  It is unclear what  delusions she has been having. According to ED notes,  stated that patient was talking to herself and was paranoid and was refusing to come to the hospital which is why her  took out ECO. Patient was seen by [facility] crisis worker but prescreening paperwork is not in the chart. The patient reports that she has been very tired in the past week due to stress at work. The tiredness has led to increased somnolence at home. She states she also thought that she may have had the coronavirus this past week because she had muscle aches and lost her sense of taste or smell for a day. She also complains of decreasing appetite and weight loss although not sure how many pounds. Nevertheless, she says all of this is due to working too hard and not taking vacation. Work has been more stressful lately due to loss of coworkers from [employer].   She denies feeling depressed or sad although she admits to having crying spells which she says are due to work-related stress. She denies anhedonia, feelings of helplessness or hopelessness. She denies suicidal ideations. She denies auditory or visual hallucinations or paranoia.     The patient denies use of alcohol and recreational drugs. Her UDS was negative.     Hospital course: Patient had brief and uneventful hospitalization. She was admitted from Saturday and she was discharged on Monday after court hearing. She was released by the court. She was passive during the hospital course. She stays mainly in her room and in her bed sleeping a lot. She said she was tired and trying to release stress from her stressful job. She was unwilling for dose of antidepressant to be increased. She was also unwilling to take antipsychotic medication. However no psychotic symptoms were observed during this hospital course. Patient states she will follow-up with a psychiatrist in the future if she feels that it is necessary. She mentioned that her brother-in-law is a psychiatrist and her mother is a psych nurse and they can take care of her at home. She denies suicidal ideations or psychotic symptoms. DISPOSITION/FOLLOW-UP     Disposition: Home    Follow-up Appointments: Follow-up Information     Follow up With Specialties Details Why Contact Info    Barbara Jurado MD Internal Medicine   [place] [id number] [phone]          Pt. has been advised to contact intake at the warm line  to complete intake with case management services  @ [address]  [phone]. Crisis Hotline at [phone] immediately. (Emergency se. .. Or pt.  Can follow up with [address] [place] [address], [facility] [address]  Phone: [phone]            Patient was relased by the court            MEDICATION CHANGES   Outpatient medications:  No current facility-administered medications on file prior to encounter. Current Outpatient Medications on File Prior to Encounter   Medication Sig Dispense Refill    melatonin 300 mcg tab Take 1.5 mg by mouth nightly as needed. 30 Tab 0    atorvastatin (LIPITOR) 20 mg tablet Take 10 mg by mouth daily.  escitalopram oxalate (LEXAPRO) 10 mg tablet Take 10 mg by mouth daily. Discharged medication:  Discharge Medication List as of 9/21/2020 10:22 AM      CONTINUE these medications which have NOT CHANGED    Details   melatonin 300 mcg tab Take 1.5 mg by mouth nightly as needed., No Print, Disp-30 Tab,R-0      atorvastatin (LIPITOR) 20 mg tablet Take 10 mg by mouth daily. , Historical Med      escitalopram oxalate (LEXAPRO) 10 mg tablet Take 10 mg by mouth daily. , Historical Med         STOP taking these medications       ondansetron (ZOFRAN ODT) 4 mg disintegrating tablet Comments:   Reason for Stopping:         HYDROcodone-acetaminophen (VICODIN) 5-300 mg tablet Comments:   Reason for Stopping:               Instructions, risks (black box warning), benefits and side effects (EPS, TD, NMS) were discussed in detail prior to discharge. Patient denied any adverse medication side effects prior to discharge. LABS/IMAGING DURING ADMISSION     Results for orders placed or performed during the hospital encounter of 09/18/20   CBC WITH AUTOMATED DIFF   Result Value Ref Range    WBC 8.5 4.6 - 13.2 K/uL    RBC 5.33 (H) 4.20 - 5.30 M/uL    HGB 17.5 (H) 12.0 - 16.0 g/dL    HCT 50.0 (H) 35.0 - 45.0 %    MCV 93.8 74.0 - 97.0 FL    MCH 32.8 24.0 - 34.0 PG    MCHC 35.0 31.0 - 37.0 g/dL    RDW 12.4 11.6 - 14.5 %    PLATELET 087 752 - 390 K/uL    MPV 10.8 9.2 - 11.8 FL    NEUTROPHILS 76 (H) 40 - 73 %    LYMPHOCYTES 15 (L) 21 - 52 %    MONOCYTES 7 3 - 10 %    EOSINOPHILS 1 0 - 5 %    BASOPHILS 1 0 - 2 %    ABS. NEUTROPHILS 6.5 1.8 - 8.0 K/UL    ABS. LYMPHOCYTES 1.3 0.9 - 3.6 K/UL    ABS. MONOCYTES 0.6 0.05 - 1.2 K/UL    ABS.  EOSINOPHILS 0.1 0.0 - 0.4 K/UL ABS. BASOPHILS 0.1 0.0 - 0.1 K/UL    DF AUTOMATED     METABOLIC PANEL, COMPREHENSIVE   Result Value Ref Range    Sodium 141 136 - 145 mmol/L    Potassium 3.4 (L) 3.5 - 5.5 mmol/L    Chloride 107 100 - 111 mmol/L    CO2 25 21 - 32 mmol/L    Anion gap 9 3.0 - 18 mmol/L    Glucose 118 (H) 74 - 99 mg/dL    BUN 14 7.0 - 18 MG/DL    Creatinine 0.83 0.6 - 1.3 MG/DL    BUN/Creatinine ratio 17 12 - 20      GFR est AA >60 >60 ml/min/1.73m2    GFR est non-AA >60 >60 ml/min/1.73m2    Calcium 9.0 8.5 - 10.1 MG/DL    Bilirubin, total 0.3 0.2 - 1.0 MG/DL    ALT (SGPT) 50 13 - 56 U/L    AST (SGOT) 32 10 - 38 U/L    Alk. phosphatase 110 45 - 117 U/L    Protein, total 8.5 (H) 6.4 - 8.2 g/dL    Albumin 3.8 3.4 - 5.0 g/dL    Globulin 4.7 (H) 2.0 - 4.0 g/dL    A-G Ratio 0.8 0.8 - 1.7     ETHYL ALCOHOL   Result Value Ref Range    ALCOHOL(ETHYL),SERUM <3 0 - 3 MG/DL   DRUG SCREEN, URINE   Result Value Ref Range    BENZODIAZEPINES Negative NEG      BARBITURATES Negative NEG      THC (TH-CANNABINOL) Negative NEG      OPIATES Negative NEG      PCP(PHENCYCLIDINE) Negative NEG      COCAINE Negative NEG      AMPHETAMINES Negative NEG      METHADONE Negative NEG      HDSCOM (NOTE)    SARS-COV-2   Result Value Ref Range    Specimen source Nasopharyngeal      COVID-19 rapid test Not detected NOTD      Specimen type NP Swab          DISCHARGE MENTAL STATUS EVALUATION     Appearance/Hygiene 44 y.o.  WHITE OR  female  Hygiene: Mildly disheveled   Attitude/Behavior/Social Relatedness Appropriate   Musculoskeletal Gait/Station: appropriate  Tone (flaccid, cogwheeling, spastic): not assessed  Psychomotor (hyperkinetic, hypokinetic): calm  Involuntary movements (tics, dyskinesias, akathisa, stereotypies): none   Speech   Rate, rhythm, volume, fluency and articulation are appropriate   Mood   euthymic   Affect    restricted   Thought Process Linear and goal directed   Thought Content and Perceptual Disturbances Denies self-injurious behavior (SIB), suicidal ideation (SI), aggressive behavior or homicidal ideation (HI)    Denies auditory and visual hallucinations   Sensorium and Cognition  Grossly intact   Insight  Limited   Judgment  fair       SUICIDE RISK ASSESSMENT     [] Admission  [x] Discharge     Patient denies suicidal ideations. She denied suicidal ideations prior to admission and was not admitted for suicidal ideations. She denies history of suicide attempt. She denies depressed mood. She appears to be at a low acute risk of suicide as she was released by the court today. Completion of discharge was greater than 30 minutes. Over 50% of today's discharge was geared towards counseling and coordination of care.         Jaclyn Favre, MD  Psychiatry  [facility]

## 2020-09-21 NOTE — PROGRESS NOTES
Patient was released by the courts. Recommendations has been provided for follow up. Reviewed, verbalized understanding.

## 2020-09-21 NOTE — GROUP NOTE
MICHEL  GROUP DOCUMENTATION INDIVIDUAL Group Therapy Note Date: 9/20/2020 Group Start Time: 2000 Group End Time: 2030 Group Topic: Nursing [facility] SPECIAL TRTMT 1 Andree Suero, KARL 
 
[facility] GROUP DOCUMENTATION GROUP Group Therapy Note Attendees: 9 Attendance: Attended Interventions/techniques: Challenged and Informed Follows Directions: Followed directions Interactions: Interacted appropriately Mental Status: Calm Behavior/appearance: Attentive Goals Achieved: Able to listen to others Ralf De La Torre.  Baldo Hart

## 2020-09-21 NOTE — DISCHARGE INSTRUCTIONS
BEHAVIORAL HEALTH NURSING DISCHARGE NOTE      The following personal items collected during your admission are returned to you:   Dental Appliance: Dental Appliances: None  Vision: Visual Aid: Glasses, With patient  Hearing Aid:    Jewelry: Jewelry: None  Clothing: Clothing: Footwear, Pants, Shirt  Other Valuables: Other Valuables: None  Valuables sent to safe:        PATIENT INSTRUCTIONS:             The discharge information has been reviewed with the patient. The patient verbalized understanding.         Patient armband removed and shredded

## 2020-09-21 NOTE — BH NOTES
Patient isolated in room most of the evening. Refused offer of dinner meal but requested and was given water and snacks. Pleasant on approach. Denies SI/HI/AH. Contracts for safety. Took due medication without incident. Will continue to monitor.

## 2022-12-10 ENCOUNTER — HOSPITAL ENCOUNTER (EMERGENCY)
Age: 52
Discharge: HOME | End: 2022-12-10
Attending: EMERGENCY MEDICINE
Payer: COMMERCIAL

## 2022-12-10 VITALS
OXYGEN SATURATION: 95 % | SYSTOLIC BLOOD PRESSURE: 142 MMHG | DIASTOLIC BLOOD PRESSURE: 85 MMHG | HEART RATE: 103 BPM | RESPIRATION RATE: 20 BRPM | TEMPERATURE: 98.1 F

## 2022-12-10 DIAGNOSIS — S09.90XA INJURY OF HEAD, INITIAL ENCOUNTER: ICD-10-CM
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
## 2022-12-10 DIAGNOSIS — S01.01XA LACERATION OF SCALP WITHOUT FOREIGN BODY, INITIAL ENCOUNTER: Primary | ICD-10-CM

## 2022-12-10 PROCEDURE — 99282 EMERGENCY DEPT VISIT SF MDM: CPT

## 2022-12-10 PROCEDURE — 75810000293 HC SIMP/SUPERF WND  RPR

## 2022-12-10 RX ORDER — LIDOCAINE HYDROCHLORIDE 20 MG/ML
10 INJECTION, SOLUTION INFILTRATION; PERINEURAL ONCE
Status: DISCONTINUED | OUTPATIENT
Start: 2022-12-10 | End: 2022-12-10 | Stop reason: HOSPADM

## 2022-12-10 NOTE — ED TRIAGE NOTES
Patient states she tripped over her cat and hit her head on the hard wood floor. Patient did not pass out, denies any LOC or blood thinners.      Small lac noted to R forehead and abrasion on R knee, bleeding controlled    Endorses neck pain as well

## 2022-12-10 NOTE — DISCHARGE INSTRUCTIONS
Please ensure that you keep your forehead laceration dry and clean, attached are instructions regarding stitch and adhesive care. Please have your stitches removed in 5 days by either your primary care provider, urgent care, or return to this emergency department. If you develop any of the return precautions listed below, please return to the emergency department for repeat evaluation. Thank you allowing us to care for you today.

## 2022-12-10 NOTE — ED PROVIDER NOTES
EMERGENCY DEPARTMENT HISTORY AND PHYSICAL EXAM    2:29 PM    Date: 12/10/2022  Patient Name: Lindsay Mancia    History of Presenting Illness     Chief Complaint   Patient presents with    Fall     History Provided By: Patient  Lindsay Mancia is a 45year-old female with pmh of HLD, anxiety and depression who presents s/p fall at home just prior to arrival.  Patient reports that she was walking her house when she tripped on her therapeutic  cat and fall fell forward onto the hardwood floor hitting her right knee and forehead patient reports that she sustained a cut to her forehead which was bleeding. Patient has a laceration to her right knee but denies issues with ambulation. Patient denies loss of consciousness, nausea, vomiting, midline vertebral tenderness. Patient is not on any antiplatelet or anticoagulant medications. Patient denies prior history of head trauma, bleeds, or coagulopathy. PCP: eJn Bai MD    Current Facility-Administered Medications   Medication Dose Route Frequency Provider Last Rate Last Admin    lidocaine (XYLOCAINE) 20 mg/mL (2 %) injection 200 mg  10 mL IntraDERMal ONCE Savanah Moncada, DO         Current Outpatient Medications   Medication Sig Dispense Refill    melatonin 300 mcg tab Take 1.5 mg by mouth nightly as needed. 30 Tab 0    atorvastatin (LIPITOR) 20 mg tablet Take 10 mg by mouth daily. escitalopram oxalate (LEXAPRO) 10 mg tablet Take 10 mg by mouth daily. Past History     Past Medical History:  Past Medical History:   Diagnosis Date    Anxiety     Depression     Hypercholesteremia      Past Surgical History:  No past surgical history on file. Family History:  No family history on file. Social History:  Social History     Tobacco Use    Smoking status: Never   Substance Use Topics    Alcohol use: No    Drug use: No       Allergies:   Allergies   Allergen Reactions    Sulfa (Sulfonamide Antibiotics) Anaphylaxis       Review of Systems     Review of Systems   Constitutional:  Negative for chills and fever. HENT:  Negative for ear discharge, nosebleeds and trouble swallowing. Eyes:  Negative for photophobia, pain and visual disturbance. Respiratory:  Negative for shortness of breath, wheezing and stridor. Cardiovascular:  Negative for chest pain, palpitations and leg swelling. Gastrointestinal:  Negative for abdominal pain, nausea and vomiting. Genitourinary:  Negative for dysuria and hematuria. Musculoskeletal:  Positive for neck pain. Negative for back pain and neck stiffness. Skin:  Positive for wound. Allergic/Immunologic: Negative for immunocompromised state. Neurological:  Positive for headaches. Negative for dizziness, tremors, seizures, syncope, facial asymmetry, speech difficulty, weakness, light-headedness and numbness. Hematological:  Does not bruise/bleed easily. Psychiatric/Behavioral:  Negative for agitation. Physical Exam   Visit Vitals  BP (!) 142/85   Pulse (!) 103   Temp 98.1 °F (36.7 °C)   Resp 20   SpO2 95%     Physical Exam  Vitals and nursing note reviewed. Constitutional:       General: She is not in acute distress. Appearance: Normal appearance. She is not ill-appearing, toxic-appearing or diaphoretic. HENT:      Head: Normocephalic. Comments: 3.5 cm crescent shaped superficial laceration superior to the right eyebrow with controlled bleeding     Right Ear: Ear canal and external ear normal. There is no impacted cerumen. Left Ear: Tympanic membrane, ear canal and external ear normal. There is no impacted cerumen. Nose: Nose normal. No congestion or rhinorrhea. Mouth/Throat:      Mouth: Mucous membranes are moist.      Pharynx: Oropharynx is clear. No oropharyngeal exudate or posterior oropharyngeal erythema. Eyes:      General:         Right eye: No discharge. Left eye: No discharge. Extraocular Movements: Extraocular movements intact. Conjunctiva/sclera: Conjunctivae normal.      Pupils: Pupils are equal, round, and reactive to light. Cardiovascular:      Rate and Rhythm: Normal rate and regular rhythm. Pulses: Normal pulses. Heart sounds: Normal heart sounds. No murmur heard. No friction rub. No gallop. Pulmonary:      Effort: Pulmonary effort is normal. No respiratory distress. Breath sounds: Normal breath sounds. No stridor. No wheezing, rhonchi or rales. Abdominal:      General: There is no distension. Palpations: Abdomen is soft. There is no mass. Tenderness: There is no abdominal tenderness. There is no guarding or rebound. Musculoskeletal:         General: Tenderness (Tenderness to anterior right knee with no pain upon varus valgus testing, full ROM of right knee, neurovascular intact distally) present. Normal range of motion. Cervical back: Normal range of motion and neck supple. Tenderness (Lateral paracervical tenderness) present. No rigidity. Right lower leg: No edema. Left lower leg: No edema. Skin:     General: Skin is warm and dry. Capillary Refill: Capillary refill takes less than 2 seconds. Neurological:      General: No focal deficit present. Mental Status: She is alert and oriented to person, place, and time. Cranial Nerves: No cranial nerve deficit. Motor: No weakness. Gait: Gait normal.   Psychiatric:         Mood and Affect: Mood normal.         Behavior: Behavior normal.         Thought Content: Thought content normal.       Diagnostic Study Results     Labs -  No results found for this or any previous visit (from the past 12 hour(s)). Radiologic Studies -   No orders to display     Medical Decision Making   I am the first provider for this patient. I reviewed the vital signs, available nursing notes, past medical history, past surgical history, family history and social history. Vital Signs-Reviewed the patient's vital signs.     Records Reviewed: Nursing Notes and Old Medical Records (Time of Review: 2:29 PM)    ED Course: Progress Notes, Reevaluation, and Consults:       Provider Notes (Medical Decision Making):   MDM  17-year-old female presenting status post mechanical fall at home with head injury. No loss of conscious, no nausea or vomiting, no mental status change, no seizure, no anticoagulation medication. Forehead laceration present with controlled bleeding. Abrasion to right knee. Initial vitals demonstrate hypertension and pulse of 103, otherwise appropriate. On my examination tachycardia resolved. No hemotympanum, no periorbital ecchymoses, normal cranial nerve exam, no nasal hematoma, no oral lacerations or abrasions, no vertebral midline tenderness to palpation. Full range of right knee with motion testing. Patient does not meet criteria for CT imaging based on [place] CT rules. Low suspicion for right knee fracture, patient declines x-rays at this time. Verbal consent obtained from patient and patient's laceration to the superior right eyebrow was repaired with 2 sutures and the remaining with Steri-Strips due to superficial nature of wound for best cosmesis. Patient tolerated procedure without difficulty with no complications. Pt reports improvement in symptoms at this time. Patient has no new complaints, changes, or physical findings. Care plan outlined and precautions discussed. Discussed suture care and removal in 5 days with PCP/urgent care/ER. All of pt's questions and concerns were addressed. Alarm symptoms and return precautions associated with chief complaint and evaluation were reviewed with the patient in detail. Patient was instructed to follow up with PCP, as well as strict return precautions to the ED upon further deterioration. Patient is ready to go home. The patient verbalized understanding and agreement with plan.      Wound Repair    Date/Time: 12/10/2022 2:30 PM  Performed by: residentSupervising provider: Dr. Yoselin Baker  Preparation: skin prepped with ChloraPrep and sterile field established  Pre-procedure re-eval: Immediately prior to the procedure, the patient was reevaluated and found suitable for the planned procedure and any planned medications. Time out: Immediately prior to the procedure a time out was called to verify the correct patient, procedure, equipment, staff and marking as appropriate. .  Location details: scalp  Wound length: 3.5 cm. Anesthesia: local infiltration    Anesthesia:  Local Anesthetic: lidocaine 2% without epinephrine  Anesthetic total: 1 mL  Foreign bodies: no foreign bodies  Irrigation solution: saline  Irrigation method: syringe  Debridement: none  Skin closure: 5-0 nylon and Steri-Strips  Number of sutures: 2  Technique: simple  Approximation: close  Dressing: antibiotic ointment  Patient tolerance: patient tolerated the procedure well with no immediate complications  My total time at bedside, performing this procedure was 16-30 minutes. Comments: Wound was irrigated with chlorhexidine based solution mixed with sterile water through 20-gauge catheter attached to 60 mL syringe. Critical Care Time: N/A    Diagnosis     Clinical Impression:   1. Laceration of scalp without foreign body, initial encounter    2. Fall, initial encounter    3.  Injury of head, initial encounter      Disposition: Home    Follow-up Information       Follow up With Specialties Details Why Contact Info    Jen Bai MD Internal Medicine Physician Schedule an appointment as soon as possible for a visit in 5 days To follow up this visit; for removal of stitches [address] [place] [address]      [facility] EMERGENCY DEPT Emergency Medicine Go to  As needed; worsening of symptoms, nausea or vomiting, changes to vision, for stitch removal, or for any new or concerning symptoms [address] [place] [address]  [phone]             Patient's Medications   Start Taking    No medications on file   Continue Taking    ATORVASTATIN (LIPITOR) 20 MG TABLET    Take 10 mg by mouth daily. ESCITALOPRAM OXALATE (LEXAPRO) 10 MG TABLET    Take 10 mg by mouth daily. MELATONIN 300 MCG TAB    Take 1.5 mg by mouth nightly as needed. These Medications have changed    No medications on file   Stop Taking    No medications on file     Disclaimer: Sections of this note are dictated using utilizing voice recognition software. Minor typographical errors may be present. If questions arise, please do not hesitate to contact me or call our department.